# Patient Record
Sex: MALE | Race: WHITE | NOT HISPANIC OR LATINO | Employment: FULL TIME | URBAN - METROPOLITAN AREA
[De-identification: names, ages, dates, MRNs, and addresses within clinical notes are randomized per-mention and may not be internally consistent; named-entity substitution may affect disease eponyms.]

---

## 2023-07-12 ENCOUNTER — TELEPHONE (OUTPATIENT)
Dept: UROLOGY | Facility: AMBULATORY SURGERY CENTER | Age: 38
End: 2023-07-12

## 2023-07-12 NOTE — TELEPHONE ENCOUNTER
New Patient    What is the reason for the patient’s appointment?: Patient calling to schedule a VAS consult    What office location does the patient prefer?:  [facility]    Does patient have Imaging/Lab Results:    Have patient records been requested?:  If No, are the records showing in Epic:       INSURANCE:   Do we accept the patient's insurance or is the patient Self-Pay?: Yes    Insurance Provider: [employer]   Plan Type/Number:   Member ID#:       HISTORY:   Has the patient had any previous Urologist(s)?: No     Was the patient seen in the ED?: No    Has the patient had any outside testing done?: n/a    Does the patient have a personal history of cancer?: No    Patient scheduled on 8/2 at 2:30 with Dr. Ant Douglas in [facility]

## 2023-07-27 RX ORDER — ACETAMINOPHEN 500 MG
500 TABLET ORAL EVERY 6 HOURS PRN
COMMUNITY

## 2023-08-10 ENCOUNTER — OFFICE VISIT (OUTPATIENT)
Dept: UROLOGY | Facility: CLINIC | Age: 38
End: 2023-08-10
Payer: COMMERCIAL

## 2023-08-10 VITALS
WEIGHT: 182.4 LBS | BODY MASS INDEX: 29.32 KG/M2 | SYSTOLIC BLOOD PRESSURE: 122 MMHG | OXYGEN SATURATION: 97 % | HEIGHT: 66 IN | DIASTOLIC BLOOD PRESSURE: 90 MMHG | HEART RATE: 97 BPM | TEMPERATURE: 97.8 F

## 2023-08-10 DIAGNOSIS — Z30.2 ENCOUNTER FOR STERILIZATION: Primary | ICD-10-CM

## 2023-08-10 PROCEDURE — 99203 OFFICE O/P NEW LOW 30 MIN: CPT | Performed by: UROLOGY

## 2023-08-10 RX ORDER — LORAZEPAM 1 MG/1
1 TABLET ORAL ONCE
Qty: 1 TABLET | Refills: 0 | Status: SHIPPED | OUTPATIENT
Start: 2023-08-10 | End: 2023-08-10

## 2023-08-10 NOTE — PROGRESS NOTES
UROLOGY PROGRESS NOTE         NAME: Becka Seymour  AGE: 41 y.o. SEX: male  : 1985   MRN: [id number]    DATE: 8/10/2023  TIME: 3:39 PM    Assessment and Plan      Impression:   1. Encounter for sterilization  -     LORazepam (ATIVAN) 1 mg tablet; Take 1 tablet (1 mg total) by mouth once for 1 dose Take 1 hour prior to vasectomy    Patient would like to proceed with vasectomy. Pros cons options outcomes discussed.  Plan: Vasectomy at his discretion. Prescription for Ativan called in      Chief Complaint     Chief Complaint   Patient presents with   • Advice Only   • Vasectomy     History of Present Illness     HPI: Becka Seymour is a 41y.o. year old male who presents with history of an undescended testicle with his right side to work on as a child. He has 2 children. His partner has no children. They have been together for 2 years. They are sure they want to proceed. We discussed all the pros cons and options in detail we also discussed the process and postop issues and IntraOp issues. He would like to proceed. All their questions were answered. The following portions of the patient's history were reviewed and updated as appropriate: allergies, current medications, past family history, past medical history, past social history, past surgical history and problem list.  History reviewed. No pertinent past medical history. Past Surgical History:   Procedure Laterality Date   • SCROTAL SURGERY     • WISDOM TOOTH EXTRACTION       shoulder  Review of Systems     Const: Denies chills, fever and weight loss. CV: Denies chest pain. Resp: Denies SOB. GI: Denies abdominal pain, nausea and vomiting. : Denies symptoms other than stated above. Musculo: Denies back pain.     Objective   /90 (BP Location: Left arm, Patient Position: Sitting, Cuff Size: Adult)   Pulse 97   Temp 97.8 °F (36.6 °C)   Ht 5' 6" (1.676 m)   Wt 82.7 kg (182 lb 6.4 oz)   SpO2 97%   BMI 29.44 kg/m² Physical Exam  Const: Appears healthy and well developed. No signs of acute distress present. Resp: Respirations are regular and unlabored. CV: Rate is regular. Rhythm is regular. Abdomen: Abdomen is soft, nontender, and nondistended. Kidneys are not palpable. : Right testicle slightly smaller. No masses. Right testicle fixed in position but well into the scrotum. Vas deferens easy to palpate. Left side normal.  No hernias. He would likely need 2 incisions discussed. Psych: Patient's attitude is cooperative.  Mood is normal. Affect is normal.    Current Medications     Current Outpatient Medications:   •  LORazepam (ATIVAN) 1 mg tablet, Take 1 tablet (1 mg total) by mouth once for 1 dose Take 1 hour prior to vasectomy, Disp: 1 tablet, Rfl: 0  •  acetaminophen (TYLENOL) 500 mg tablet, Take 500 mg by mouth every 6 (six) hours as needed, Disp: , Rfl:         Edy Avery MD

## 2023-08-25 ENCOUNTER — PROCEDURE VISIT (OUTPATIENT)
Dept: UROLOGY | Facility: CLINIC | Age: 38
End: 2023-08-25
Payer: COMMERCIAL

## 2023-08-25 VITALS
SYSTOLIC BLOOD PRESSURE: 130 MMHG | BODY MASS INDEX: 28.48 KG/M2 | WEIGHT: 177.2 LBS | HEIGHT: 66 IN | HEART RATE: 85 BPM | DIASTOLIC BLOOD PRESSURE: 80 MMHG | OXYGEN SATURATION: 97 % | TEMPERATURE: 98.8 F

## 2023-08-25 DIAGNOSIS — Z30.2 ENCOUNTER FOR STERILIZATION: Primary | ICD-10-CM

## 2023-08-25 PROCEDURE — 55250 REMOVAL OF SPERM DUCT(S): CPT | Performed by: UROLOGY

## 2023-08-25 PROCEDURE — 88302 TISSUE EXAM BY PATHOLOGIST: CPT | Performed by: PATHOLOGY

## 2023-08-25 RX ORDER — OXYCODONE HYDROCHLORIDE AND ACETAMINOPHEN 5; 325 MG/1; MG/1
1-2 TABLET ORAL EVERY 6 HOURS PRN
Qty: 20 TABLET | Refills: 0 | Status: SHIPPED | OUTPATIENT
Start: 2023-08-25

## 2023-08-25 NOTE — PROGRESS NOTES
Vasectomy     Date/Time 8/25/2023 10:30 AM     Performed by  Yuri Caputo MD   Authorized by Yuri Caputo MD     [place] Protocol   Consent: Verbal consent obtained. Written consent obtained. Risks and benefits: risks, benefits and alternatives were discussed  Consent given by: patient  Time out: Immediately prior to procedure a "time out" was called to verify the correct patient, procedure, equipment, support staff and site/side marked as required. Timeout called at: 8/25/2023 11:24 AM.  Patient identity confirmed: verbally with patient        Local anesthesia used: yes     Anesthesia   Local anesthesia used: yes  Local Anesthetic: bupivacaine 0.5% without epinephrine  Anesthetic total: 4 mL     Sedation   Patient sedated: no        Specimen: yes    Culture: no   Procedure Details   Procedure Notes: Patient underwent bilateral vasectomy. 2 incisions. Adhesions on the right side from his previous surgery. Anesthetized with local anesthesia. Scrotal incision 7 mm. Vas deferens dissected out. Small portion removed and sent to the pathologist.  Both ends tied off with nylon and  from each other with chromic. Burned with cautery intraluminally on both ends. Dropped back into the scrotum. No adhesions. Similar procedure done on the left side. No complications. Full-thickness skin suture with chromic and glue. Instructions follow-up arrangements and prescription was addressed. Patient given 1 dose of Cipro here today.

## 2023-08-25 NOTE — PROGRESS NOTES
UROLOGY PROGRESS NOTE         NAME: Hawk Álvarez  AGE: 41 y.o. SEX: male  : 1985   MRN: [id number]    DATE: 2023  TIME: 11:26 AM    Assessment and Plan      Impression:   1. Encounter for sterilization  -     oxyCODONE-acetaminophen (PERCOCET) 5-325 mg per tablet; Take 1-2 tablets by mouth every 6 (six) hours as needed for moderate pain or severe pain Max Daily Amount: 8 tablets  -     Semen analysis, post-vasectomy; Future  -     Semen analysis, post-vasectomy; Future  -     Vasectomy    Bilateral vasectomy today. Tolerated well.  Plan: Instructions follow-up arrangements and prescription taken care of. Chief Complaint     Chief Complaint   Patient presents with   • Vasectomy     History of Present Illness     HPI: Hawk Álvarez is a 41y.o. year old male who presents with for bilateral vasectomy. Pros cons options previously discussed. See separate note for vasectomy. The following portions of the patient's history were reviewed and updated as appropriate: allergies, current medications, past family history, past medical history, past social history, past surgical history and problem list.  History reviewed. No pertinent past medical history. Past Surgical History:   Procedure Laterality Date   • SCROTAL SURGERY     • WISDOM TOOTH EXTRACTION       shoulder  Review of Systems     Const: Denies chills, fever and weight loss. CV: Denies chest pain. Resp: Denies SOB. GI: Denies abdominal pain, nausea and vomiting. : Denies symptoms other than stated above. Musculo: Denies back pain. Objective   /80 (BP Location: Left arm, Patient Position: Sitting, Cuff Size: Adult)   Pulse 85   Temp 98.8 °F (37.1 °C)   Ht 5' 6" (1.676 m)   Wt 80.4 kg (177 lb 3.2 oz)   SpO2 97%   BMI 28.60 kg/m²     Physical Exam  Const: Appears healthy and well developed. No signs of acute distress present. Resp: Respirations are regular and unlabored. CV: Rate is regular. Rhythm is regular. Abdomen: Abdomen is soft, nontender, and nondistended. Kidneys are not palpable. : Normal penis. Adhesions in the right hemiscrotum from previous testicular surgery. Right testicle not mobile. Cord structure shortened. Vas deferens however palpable higher up. Left side normal.  Psych: Patient's attitude is cooperative.  Mood is normal. Affect is normal.    Current Medications     Current Outpatient Medications:   •  acetaminophen (TYLENOL) 500 mg tablet, Take 500 mg by mouth every 6 (six) hours as needed, Disp: , Rfl:   •  oxyCODONE-acetaminophen (PERCOCET) 5-325 mg per tablet, Take 1-2 tablets by mouth every 6 (six) hours as needed for moderate pain or severe pain Max Daily Amount: 8 tablets, Disp: 20 tablet, Rfl: 0  •  LORazepam (ATIVAN) 1 mg tablet, Take 1 tablet (1 mg total) by mouth once for 1 dose Take 1 hour prior to vasectomy, Disp: 1 tablet, Rfl: 0        Lex Patel MD

## 2023-08-30 PROCEDURE — 88302 TISSUE EXAM BY PATHOLOGIST: CPT | Performed by: PATHOLOGY

## 2024-11-13 ENCOUNTER — NURSE TRIAGE (OUTPATIENT)
Age: 39
End: 2024-11-13

## 2024-11-13 NOTE — TELEPHONE ENCOUNTER
"Patient of Dr. D'Amico, last seen 8/25/2023, called stating he is experiencing left testicle pain that is dull and aching that has been over the last two weeks. Her rates his pain a 4-5/10 and says the pain comes and goes. Denies using any OTC pain medication, scrotal support, or icing the area. He denies fever, blood in urine, difficulty urinating, or swelling. I revived ED precautions and to call the office back if his symptoms worsen. Please advise.    Reason for Disposition   ALL other penis - scrotum symptoms  (Exception: Painless rash < 24 hours duration.)    Answer Assessment - Initial Assessment Questions  1. SYMPTOM: \"What's the main symptom you're concerned about?\" (e.g., discharge from penis, rash, pain, itching, swelling)      Dull, aching pain in left testicle     3. ONSET: \"When did your symptoms start?\"      Two weeks ago    4. PAIN: \"Is there any pain?\" If Yes, ask: \"How bad is it?\"  (Scale 1-10; or mild, moderate, severe)      4-5/10    5. URINE: \"Any difficulty passing urine?\" If Yes, ask: \"When was the last time?\"      No    6. CAUSE: \"What do you think is causing the symptoms?\"      Unsure    7. OTHER SYMPTOMS: \"Do you have any other symptoms?\" (e.g., fever, abdomen pain, blood in urine)      Lower abdominal    Protocols used: Penis and Scrotum Symptoms-Adult-[place]    "

## 2024-11-14 NOTE — TELEPHONE ENCOUNTER
Agree with recommendations.  Continue to monitor symptoms and if symptoms worsen should reach back out to the office and schedule follow-up appointment.

## 2024-11-15 NOTE — TELEPHONE ENCOUNTER
Unable to reach pt mailbox full. Please advise as below.    [Feeling Tired] : feeling tired [As Noted in HPI] : as noted in HPI [Chest Pain] : no chest pain [Lower Ext Edema] : lower extremity edema [Shortness Of Breath] : no shortness of breath [Negative] : Heme/Lymph [[id number]] : reports foamy urine

## 2024-11-18 ENCOUNTER — TELEPHONE (OUTPATIENT)
Dept: UROLOGY | Facility: CLINIC | Age: 39
End: 2024-11-18

## 2024-11-18 NOTE — TELEPHONE ENCOUNTER
Unable to leave a vm, mailbox is full. Letter sent to pt that we have tried to contact him without success. Will advise to call us to make an appt since one has not been scheduled.